# Patient Record
Sex: MALE | Race: WHITE | Employment: FULL TIME | URBAN - METROPOLITAN AREA
[De-identification: names, ages, dates, MRNs, and addresses within clinical notes are randomized per-mention and may not be internally consistent; named-entity substitution may affect disease eponyms.]

---

## 2022-05-03 ENCOUNTER — TELEPHONE (OUTPATIENT)
Dept: SURGERY | Age: 27
End: 2022-05-03

## 2022-05-03 NOTE — TELEPHONE ENCOUNTER
Received referral from Dr. Kelly Miles for Dr. Jackeline Aguero however patient was scheduled with Dr. Sin Centeno on 5/9. Dr. Jackeline Aguero would like to see patient sooner than 5/9. Please have patient speak to Phillips when he calls back.

## 2022-05-04 NOTE — TELEPHONE ENCOUNTER
Pt called to return Yajaira's vm about getting him scheduled before 5/9 to see Dr. Shakeel Hsieh. Please call him at [phone]. Thanks!

## 2022-05-04 NOTE — TELEPHONE ENCOUNTER
Spoke to Kyler Yap who informed me that he was in meetings all day and would not have been able to come in today to see Dr. Dane Quiros. Dr. Dane Quiros okay with Dr. Jak Carrillo seeing patient on Monday 5/9. nothing

## 2022-05-09 ENCOUNTER — OFFICE VISIT (OUTPATIENT)
Dept: SURGERY | Age: 27
End: 2022-05-09
Payer: COMMERCIAL

## 2022-05-09 VITALS
SYSTOLIC BLOOD PRESSURE: 141 MMHG | DIASTOLIC BLOOD PRESSURE: 77 MMHG | HEIGHT: 74 IN | RESPIRATION RATE: 16 BRPM | TEMPERATURE: 97.9 F | OXYGEN SATURATION: 100 % | HEART RATE: 68 BPM | WEIGHT: 248 LBS | BODY MASS INDEX: 31.83 KG/M2

## 2022-05-09 DIAGNOSIS — L73.2 HIDRADENITIS: Primary | ICD-10-CM

## 2022-05-09 PROCEDURE — 99203 OFFICE O/P NEW LOW 30 MIN: CPT | Performed by: SURGERY

## 2022-05-09 RX ORDER — AMOXICILLIN AND CLAVULANATE POTASSIUM 875; 125 MG/1; MG/1
1 TABLET, FILM COATED ORAL 2 TIMES DAILY
Qty: 20 TABLET | Refills: 0 | Status: SHIPPED | OUTPATIENT
Start: 2022-05-09 | End: 2022-05-19

## 2022-05-09 RX ORDER — FINASTERIDE 1 MG/1
1 TABLET, FILM COATED ORAL DAILY
COMMUNITY

## 2022-05-09 NOTE — PROGRESS NOTES
Subjective:     Patient is a 32 y.o. man with hidradenitis    The patient is referred by Dr. Lucy Mccallum. Results of consultation will be shared via electronic medical record    HPI: Mr. Maryan Parks reports intermittent history of pain and swelling in areas of his groin and buttocks. He has had these areas intermittently lanced in the past. He reports two spots near his bottom that flared up over last two weeks though they have settled down at the moment. He does not smoke. Not in a hospital admission. No Known Allergies   Social History     Tobacco Use    Smoking status: Former Smoker     Types: Cigarettes    Smokeless tobacco: Former User     Types: Chew    Tobacco comment: Social smoker in [employer]   Substance Use Topics    Alcohol use: Yes     Comment: socially     FH: No FH of hidradenitis      Review of Systems    GEN: reviewed and negative except as noted in HPI. GI: reviewed and negative except as noted in HPI. Objective:     GEN: appears well, no distress, appears stated age  PSYCH: normal mood, normal affect  NECK: no neck masses, trachea midline  ENT: moist oral mucosa; anicteric  SKIN: no rash or jaundice  CV: regular heart rate and rhythm  PULM: normal respiratory effort, no wheezing  GI: soft non tender abdomen. Normal bowel sounds  RECTAL: two small pores anterior perianal skin. One on right, one on left. No retained abscess     EXT/NEURO: normal gait, strength/sensation grossly intact in all extremities      Assessment:     Hidradenitis     Plan:     Start antibiotics.  See me if worsens or fails to resolve    Sebastien Quintana M.D.  5/9/22   11:42 AM